# Patient Record
Sex: MALE | Race: WHITE | NOT HISPANIC OR LATINO | Employment: FULL TIME | URBAN - NONMETROPOLITAN AREA
[De-identification: names, ages, dates, MRNs, and addresses within clinical notes are randomized per-mention and may not be internally consistent; named-entity substitution may affect disease eponyms.]

---

## 2019-01-17 ENCOUNTER — TRANSCRIBE ORDERS (OUTPATIENT)
Dept: LAB | Facility: HOSPITAL | Age: 54
End: 2019-01-17

## 2019-01-17 ENCOUNTER — HOSPITAL ENCOUNTER (OUTPATIENT)
Dept: ULTRASOUND IMAGING | Facility: HOSPITAL | Age: 54
Discharge: HOME | End: 2019-01-17
Attending: INTERNAL MEDICINE | Admitting: INTERNAL MEDICINE

## 2019-01-17 DIAGNOSIS — R79.89 ABNORMAL LIVER FUNCTION TEST: ICD-10-CM

## 2019-01-17 DIAGNOSIS — R79.89 ABNORMAL LIVER FUNCTION TEST: Primary | ICD-10-CM

## 2019-01-17 PROCEDURE — 76705 ECHO EXAM OF ABDOMEN: CPT

## 2019-02-22 ENCOUNTER — APPOINTMENT (OUTPATIENT)
Dept: PREADMISSION TESTING | Facility: HOSPITAL | Age: 54
End: 2019-02-22

## 2019-02-25 ENCOUNTER — APPOINTMENT (OUTPATIENT)
Dept: PREADMISSION TESTING | Facility: HOSPITAL | Age: 54
End: 2019-02-25

## 2019-02-25 VITALS
SYSTOLIC BLOOD PRESSURE: 156 MMHG | HEART RATE: 88 BPM | OXYGEN SATURATION: 96 % | DIASTOLIC BLOOD PRESSURE: 89 MMHG | WEIGHT: 240.74 LBS | BODY MASS INDEX: 35.66 KG/M2 | HEIGHT: 69 IN

## 2019-02-25 LAB
ALBUMIN SERPL-MCNC: 4.9 G/DL (ref 3.5–5)
ALBUMIN/GLOB SERPL: 1.6 G/DL (ref 1.1–2.5)
ALP SERPL-CCNC: 63 U/L (ref 24–120)
ALT SERPL W P-5'-P-CCNC: 99 U/L (ref 0–54)
ANION GAP SERPL CALCULATED.3IONS-SCNC: 7 MMOL/L (ref 4–13)
AST SERPL-CCNC: 70 U/L (ref 7–45)
BASOPHILS # BLD AUTO: 0.03 10*3/MM3 (ref 0–0.2)
BASOPHILS NFR BLD AUTO: 0.5 % (ref 0–2)
BILIRUB SERPL-MCNC: 0.7 MG/DL (ref 0.1–1)
BUN BLD-MCNC: 19 MG/DL (ref 5–21)
BUN/CREAT SERPL: 16.5 (ref 7–25)
CALCIUM SPEC-SCNC: 10 MG/DL (ref 8.4–10.4)
CHLORIDE SERPL-SCNC: 104 MMOL/L (ref 98–110)
CO2 SERPL-SCNC: 28 MMOL/L (ref 24–31)
CREAT BLD-MCNC: 1.15 MG/DL (ref 0.5–1.4)
DEPRECATED RDW RBC AUTO: 40.6 FL (ref 40–54)
EOSINOPHIL # BLD AUTO: 0.03 10*3/MM3 (ref 0–0.7)
EOSINOPHIL NFR BLD AUTO: 0.5 % (ref 0–4)
ERYTHROCYTE [DISTWIDTH] IN BLOOD BY AUTOMATED COUNT: 12.4 % (ref 12–15)
GFR SERPL CREATININE-BSD FRML MDRD: 66 ML/MIN/1.73
GLOBULIN UR ELPH-MCNC: 3.1 GM/DL
GLUCOSE BLD-MCNC: 111 MG/DL (ref 70–100)
HCT VFR BLD AUTO: 44.3 % (ref 40–52)
HGB BLD-MCNC: 16.1 G/DL (ref 14–18)
IMM GRANULOCYTES # BLD AUTO: 0.03 10*3/MM3 (ref 0–0.05)
IMM GRANULOCYTES NFR BLD AUTO: 0.5 % (ref 0–5)
LYMPHOCYTES # BLD AUTO: 1.49 10*3/MM3 (ref 0.72–4.86)
LYMPHOCYTES NFR BLD AUTO: 27.2 % (ref 15–45)
MCH RBC QN AUTO: 33.1 PG (ref 28–32)
MCHC RBC AUTO-ENTMCNC: 36.3 G/DL (ref 33–36)
MCV RBC AUTO: 91 FL (ref 82–95)
MONOCYTES # BLD AUTO: 0.49 10*3/MM3 (ref 0.19–1.3)
MONOCYTES NFR BLD AUTO: 8.9 % (ref 4–12)
NEUTROPHILS # BLD AUTO: 3.41 10*3/MM3 (ref 1.87–8.4)
NEUTROPHILS NFR BLD AUTO: 62.4 % (ref 39–78)
NRBC BLD AUTO-RTO: 0 /100 WBC (ref 0–0)
PLATELET # BLD AUTO: 192 10*3/MM3 (ref 130–400)
PMV BLD AUTO: 9.4 FL (ref 6–12)
POTASSIUM BLD-SCNC: 4.4 MMOL/L (ref 3.5–5.3)
PROT SERPL-MCNC: 8 G/DL (ref 6.3–8.7)
RBC # BLD AUTO: 4.87 10*6/MM3 (ref 4.8–5.9)
SODIUM BLD-SCNC: 139 MMOL/L (ref 135–145)
WBC NRBC COR # BLD: 5.48 10*3/MM3 (ref 4.8–10.8)

## 2019-02-25 PROCEDURE — 36415 COLL VENOUS BLD VENIPUNCTURE: CPT

## 2019-02-25 PROCEDURE — 85025 COMPLETE CBC W/AUTO DIFF WBC: CPT | Performed by: SPECIALIST

## 2019-02-25 PROCEDURE — 80053 COMPREHEN METABOLIC PANEL: CPT | Performed by: SPECIALIST

## 2019-02-25 NOTE — DISCHARGE INSTRUCTIONS
DAY OF SURGERY INSTRUCTIONS        YOUR SURGEON: ***Damián    PROCEDURE: ***exam under anesthesia for hemorroids    DATE OF SURGERY: ***3/1/19    ARRIVAL TIME: AS DIRECTED BY OFFICE                    MANAGING PAIN AFTER SURGERY    We know you are probably wondering what your pain will be like after surgery.  Following surgery it is unrealistic to expect you will not have pain.   Pain is how our bodies let us know that something is wrong or cautions us to be careful.  That said, our goal is to make your pain tolerable.    Methods we may use to treat your pain include (oral or IV medications, PCAs, epidurals, nerve blocks, etc.)   While some procedures require IV pain medications for a short time after surgery, transitioning to pain medications by mouth allows for better management of pain.   Your nurse will encourage you to take oral pain medications whenever possible.  IV medications work almost immediately, but only last a short while.  Taking medications by mouth allows for a more constant level of medication in your blood stream for a longer period of time.      Once your pain is out of control it is harder to get back under control.  It is important you are aware when your next dose of pain medication is due.  If you are admitted, your nurse may write the time of your next dose on the white board in your room to help you remember.      We are interested in your pain and encourage you to inform us about aggravating factors during your visit.   Many times a simple repositioning every few hours can make a big difference.    If your physician says it is okay, do not let your pain prevent you from getting out of bed. Be sure to call your nurse for assistance prior to getting up so you do not fall.      Before surgery, please decide your tolerable pain goal.  These faces help describe the pain ratings we use on a 0-10 scale.   Be prepared to tell us your goal and whether or not you take pain or anxiety medications  at home.            BEFORE YOU COME TO THE HOSPITAL  (Pre-op instructions)  • Do not eat, drink, smoke or chew gum after midnight the night before surgery.  This also includes no mints.  • Morning of surgery take only the medicines you have been instructed with a sip of water unless otherwise instructed  by your physician.  • Do not shave, wear makeup or dark nail polish.  • Remove all jewelry including rings.  • Leave anything you consider valuable at home.  • Leave your suitcase in the car until after your surgery.  • Bring the following with you if applicable:  o Picture ID and insurance, Medicare or Medicaid cards  o Co-pay/deductible required by insurance (cash, check, credit card)  o Copy of advance directive, living will or power-of- documents if not brought to PAT  o CPAP or BIPAP mask and tubing  o Relaxation aids (MP3 player, book, magazine)  • On the day of surgery check in at registration located at the main entrance of the hospital.       Outpatient Surgery Guidelines, Adult  Outpatient procedures are those for which the person having the procedure is allowed to go home the same day as the procedure. Various procedures are done on an outpatient basis. You should follow some general guidelines if you will be having an outpatient procedure.  LET YOUR HEALTH CARE PROVIDER KNOW ABOUT:  · Any allergies you have.  · All medicines you are taking, including vitamins, herbs, eye drops, creams, and over-the-counter medicines.  · Previous problems you or members of your family have had with the use of anesthetics.  · Any blood disorders you have.  · Previous surgeries you have had.  · Medical conditions you have.  RISKS AND COMPLICATIONS  Your health care provider will discuss possible risks and complications with you before surgery. Common risks and complications include:    · Problems due to the use of anesthetics.  · Blood loss and replacement (does not apply to minor surgical procedures).  · Temporary  increase in pain due to surgery.  · Uncorrected pain or problems that the surgery was meant to correct.  · Infection.  · New damage.  BEFORE THE PROCEDURE  · Ask your health care provider about changing or stopping your regular medicines. You may need to stop taking certain medicines in the days or weeks before the procedure.  · Stop smoking at least 2 weeks before surgery. This lowers your risk for complications during and after surgery. Ask your health care provider for help with this if needed.  · Eat your usual meals and a light supper the day before surgery. Continue fluid intake. Do not drink alcohol.  · Do not eat or drink after midnight the night before your surgery.   · Arrange for someone to take you home and to stay with you for 24 hours after the procedure. Medicine given for your procedure may affect your ability to drive or to care for yourself.  · Call your health care provider's office if you develop an illness or problem that may prevent you from safely having your procedure.  AFTER THE PROCEDURE  After surgery, you will be taken to a recovery area, where your progress will be monitored. If there are no complications, you will be allowed to go home when you are awake, stable, and taking fluids well. You may have numbness around the surgical site. Healing will take some time. You will have tenderness at the surgical site and may have some swelling and bruising. You may also have some nausea.  HOME CARE INSTRUCTIONS  · Do not drive for 24 hours, or as directed by your health care provider. Do not drive while taking prescription pain medicines.  · Do not drink alcohol for 24 hours.  · Do not make important decisions or sign legal documents for 24 hours.  · You may resume a normal diet and activities as directed.  · Do not lift anything heavier than 10 pounds (4.5 kg) or play contact sports until your health care provider says it is okay.  · Change your bandages (dressings) as directed.  · Only take  over-the-counter or prescription medicines as directed by your health care provider.  · Follow up with your health care provider as directed.  SEEK MEDICAL CARE IF:  · You have increased bleeding (more than a small spot) from the surgical site.  · You have redness, swelling, or increasing pain in the wound.  · You see pus coming from the wound.  · You have a fever.  · You notice a bad smell coming from the wound or dressing.  · You feel lightheaded or faint.  · You develop a rash.  · You have trouble breathing.  · You develop allergies.  MAKE SURE YOU:  · Understand these instructions.  · Will watch your condition.  · Will get help right away if you are not doing well or get worse.     This information is not intended to replace advice given to you by your health care provider. Make sure you discuss any questions you have with your health care provider.     Document Released: 09/12/2002 Document Revised: 05/03/2016 Document Reviewed: 05/22/2014  [employer] Interactive Patient Education ©2016 [employer]       Fall Prevention in Hospitals, Adult  As a hospital patient, your condition and the treatments you receive can increase your risk for falls. Some additional risk factors for falls in a hospital include:  · Being in an unfamiliar environment.  · Being on bed rest.  · Your surgery.  · Taking certain medicines.  · Your tubing requirements, such as intravenous (IV) therapy or catheters.  It is important that you learn how to decrease fall risks while at the hospital. Below are important tips that can help prevent falls.  SAFETY TIPS FOR PREVENTING FALLS  Talk about your risk of falling.  · Ask your health care provider why you are at risk for falling. Is it your medicine, illness, tubing placement, or something else?  · Make a plan with your health care provider to keep you safe from falls.  · Ask your health care provider or pharmacist about side effects of your medicines. Some medicines can make you dizzy or  affect your coordination.  Ask for help.  · Ask for help before getting out of bed. You may need to press your call button.  · Ask for assistance in getting safely to the toilet.  · Ask for a walker or cane to be put at your bedside. Ask that most of the side rails on your bed be placed up before your health care provider leaves the room.  · Ask family or friends to sit with you.  · Ask for things that are out of your reach, such as your glasses, hearing aids, telephone, bedside table, or call button.  Follow these tips to avoid falling:  · Stay lying or seated, rather than standing, while waiting for help.  · Wear rubber-soled slippers or shoes whenever you walk in the hospital.  · Avoid quick, sudden movements.  ¨ Change positions slowly.  ¨ Sit on the side of your bed before standing.  ¨ Stand up slowly and wait before you start to walk.  · Let your health care provider know if there is a spill on the floor.  · Pay careful attention to the medical equipment, electrical cords, and tubes around you.  · When you need help, use your call button by your bed or in the bathroom. Wait for one of your health care providers to help you.  · If you feel dizzy or unsure of your footing, return to bed and wait for assistance.  · Avoid being distracted by the TV, telephone, or another person in your room.  · Do not lean or support yourself on rolling objects, such as IV poles or bedside tables.     This information is not intended to replace advice given to you by your health care provider. Make sure you discuss any questions you have with your health care provider.     Document Released: 12/15/2001 Document Revised: 01/08/2016 Document Reviewed: 08/25/2013  [employer] Interactive Patient Education ©2016 [employer]       Surgical Site Infections FAQs  What is a Surgical Site Infection (SSI)?  A surgical site infection is an infection that occurs after surgery in the part of the body where the surgery took place. Most patients  who have surgery do not develop an infection. However, infections develop in about 1 to 3 out of every 100 patients who have surgery.  Some of the common symptoms of a surgical site infection are:  · Redness and pain around the area where you had surgery  · Drainage of cloudy fluid from your surgical wound  · Fever  Can SSIs be treated?  Yes. Most surgical site infections can be treated with antibiotics. The antibiotic given to you depends on the bacteria (germs) causing the infection. Sometimes patients with SSIs also need another surgery to treat the infection.  What are some of the things that hospitals are doing to prevent SSIs?  To prevent SSIs, doctors, nurses, and other healthcare providers:  · Clean their hands and arms up to their elbows with an antiseptic agent just before the surgery.  · Clean their hands with soap and water or an alcohol-based hand rub before and after caring for each patient.  · May remove some of your hair immediately before your surgery using electric clippers if the hair is in the same area where the procedure will occur. They should not shave you with a razor.  · Wear special hair covers, masks, gowns, and gloves during surgery to keep the surgery area clean.  · Give you antibiotics before your surgery starts. In most cases, you should get antibiotics within 60 minutes before the surgery starts and the antibiotics should be stopped within 24 hours after surgery.  · Clean the skin at the site of your surgery with a special soap that kills germs.  What can I do to help prevent SSIs?  Before your surgery:  · Tell your doctor about other medical problems you may have. Health problems such as allergies, diabetes, and obesity could affect your surgery and your treatment.  · Quit smoking. Patients who smoke get more infections. Talk to your doctor about how you can quit before your surgery.  · Do not shave near where you will have surgery. Shaving with a razor can irritate your skin and  make it easier to develop an infection.  At the time of your surgery:  · Speak up if someone tries to shave you with a razor before surgery. Ask why you need to be shaved and talk with your surgeon if you have any concerns.  · Ask if you will get antibiotics before surgery.  After your surgery:  · Make sure that your healthcare providers clean their hands before examining you, either with soap and water or an alcohol-based hand rub.  · If you do not see your providers clean their hands, please ask them to do so.  · Family and friends who visit you should not touch the surgical wound or dressings.  · Family and friends should clean their hands with soap and water or an alcohol-based hand rub before and after visiting you. If you do not see them clean their hands, ask them to clean their hands.  What do I need to do when I go home from the hospital?  · Before you go home, your doctor or nurse should explain everything you need to know about taking care of your wound. Make sure you understand how to care for your wound before you leave the hospital.  · Always clean your hands before and after caring for your wound.  · Before you go home, make sure you know who to contact if you have questions or problems after you get home.  · If you have any symptoms of an infection, such as redness and pain at the surgery site, drainage, or fever, call your doctor immediately.  If you have additional questions, please ask your doctor or nurse.  Developed and co-sponsored by The Society for Healthcare Epidemiology of [place] (SHEA); Infectious Diseases Society of [place] (IDSA); American Hospital Association; Association for Professionals in Infection Control and Epidemiology (APIC); Centers for Disease Control and Prevention (CDC); and The Joint Commission.     This information is not intended to replace advice given to you by your health care provider. Make sure you discuss any questions you have with your health care  provider.     Document Released: 12/23/2014 Document Revised: 01/08/2016 Document Reviewed: 03/02/2016  [employer] Interactive Patient Education ©2016 [employer]     PATIENT/FAMILY/RESPONSIBLE PARTY VERBALIZES UNDERSTANDING OF ABOVE EDUCATION.  COPY OF PAIN SCALE GIVEN AND REVIEWED WITH VERBALIZED UNDERSTANDING.

## 2019-03-01 ENCOUNTER — ANESTHESIA (OUTPATIENT)
Dept: PERIOP | Facility: HOSPITAL | Age: 54
End: 2019-03-01

## 2019-03-01 ENCOUNTER — ANESTHESIA EVENT (OUTPATIENT)
Dept: PERIOP | Facility: HOSPITAL | Age: 54
End: 2019-03-01

## 2019-03-01 ENCOUNTER — HOSPITAL ENCOUNTER (OUTPATIENT)
Facility: HOSPITAL | Age: 54
Setting detail: HOSPITAL OUTPATIENT SURGERY
Discharge: HOME | End: 2019-03-01
Attending: SPECIALIST | Admitting: SPECIALIST

## 2019-03-01 VITALS
SYSTOLIC BLOOD PRESSURE: 131 MMHG | RESPIRATION RATE: 16 BRPM | TEMPERATURE: 97.4 F | OXYGEN SATURATION: 94 % | DIASTOLIC BLOOD PRESSURE: 76 MMHG | HEART RATE: 68 BPM

## 2019-03-01 DIAGNOSIS — K64.9 HEMORRHOID: ICD-10-CM

## 2019-03-01 PROCEDURE — 25010000002 PROPOFOL 10 MG/ML EMULSION: Performed by: NURSE ANESTHETIST, CERTIFIED REGISTERED

## 2019-03-01 PROCEDURE — 25010000002 FENTANYL CITRATE (PF) 250 MCG/5ML SOLUTION: Performed by: NURSE ANESTHETIST, CERTIFIED REGISTERED

## 2019-03-01 PROCEDURE — 88304 TISSUE EXAM BY PATHOLOGIST: CPT | Performed by: SPECIALIST

## 2019-03-01 PROCEDURE — 25010000002 SUCCINYLCHOLINE PER 20 MG: Performed by: NURSE ANESTHETIST, CERTIFIED REGISTERED

## 2019-03-01 PROCEDURE — 25010000002 MIDAZOLAM PER 1 MG: Performed by: ANESTHESIOLOGY

## 2019-03-01 PROCEDURE — 25010000002 ONDANSETRON PER 1 MG: Performed by: NURSE ANESTHETIST, CERTIFIED REGISTERED

## 2019-03-01 PROCEDURE — 25010000002 DEXAMETHASONE PER 1 MG: Performed by: NURSE ANESTHETIST, CERTIFIED REGISTERED

## 2019-03-01 DEVICE — KT HEMOST ABS SURGIFOAM PWDR 1GRAM: Type: IMPLANTABLE DEVICE | Status: FUNCTIONAL

## 2019-03-01 RX ORDER — ROCURONIUM BROMIDE 10 MG/ML
INJECTION, SOLUTION INTRAVENOUS AS NEEDED
Status: DISCONTINUED | OUTPATIENT
Start: 2019-03-01 | End: 2019-03-01 | Stop reason: SURG

## 2019-03-01 RX ORDER — BACITRACIN ZINC 500 [USP'U]/G
OINTMENT TOPICAL AS NEEDED
Status: DISCONTINUED | OUTPATIENT
Start: 2019-03-01 | End: 2019-03-01 | Stop reason: HOSPADM

## 2019-03-01 RX ORDER — MIDAZOLAM HYDROCHLORIDE 1 MG/ML
2 INJECTION INTRAMUSCULAR; INTRAVENOUS
Status: DISCONTINUED | OUTPATIENT
Start: 2019-03-01 | End: 2019-03-01 | Stop reason: HOSPADM

## 2019-03-01 RX ORDER — NALOXONE HCL 0.4 MG/ML
0.4 VIAL (ML) INJECTION AS NEEDED
Status: DISCONTINUED | OUTPATIENT
Start: 2019-03-01 | End: 2019-03-01 | Stop reason: HOSPADM

## 2019-03-01 RX ORDER — LABETALOL HYDROCHLORIDE 5 MG/ML
5 INJECTION, SOLUTION INTRAVENOUS
Status: DISCONTINUED | OUTPATIENT
Start: 2019-03-01 | End: 2019-03-01 | Stop reason: HOSPADM

## 2019-03-01 RX ORDER — OXYCODONE AND ACETAMINOPHEN 7.5; 325 MG/1; MG/1
2 TABLET ORAL EVERY 4 HOURS PRN
Status: DISCONTINUED | OUTPATIENT
Start: 2019-03-01 | End: 2019-03-01 | Stop reason: HOSPADM

## 2019-03-01 RX ORDER — OXYCODONE HYDROCHLORIDE AND ACETAMINOPHEN 5; 325 MG/1; MG/1
1-2 TABLET ORAL EVERY 4 HOURS PRN
Qty: 40 TABLET | Refills: 0 | Status: SHIPPED | OUTPATIENT
Start: 2019-03-01 | End: 2020-01-23

## 2019-03-01 RX ORDER — SODIUM CHLORIDE, SODIUM LACTATE, POTASSIUM CHLORIDE, CALCIUM CHLORIDE 600; 310; 30; 20 MG/100ML; MG/100ML; MG/100ML; MG/100ML
9 INJECTION, SOLUTION INTRAVENOUS CONTINUOUS
Status: DISCONTINUED | OUTPATIENT
Start: 2019-03-01 | End: 2019-03-01 | Stop reason: HOSPADM

## 2019-03-01 RX ORDER — IPRATROPIUM BROMIDE AND ALBUTEROL SULFATE 2.5; .5 MG/3ML; MG/3ML
3 SOLUTION RESPIRATORY (INHALATION) ONCE AS NEEDED
Status: DISCONTINUED | OUTPATIENT
Start: 2019-03-01 | End: 2019-03-01 | Stop reason: HOSPADM

## 2019-03-01 RX ORDER — IBUPROFEN 600 MG/1
600 TABLET ORAL ONCE AS NEEDED
Status: DISCONTINUED | OUTPATIENT
Start: 2019-03-01 | End: 2019-03-01 | Stop reason: HOSPADM

## 2019-03-01 RX ORDER — MAGNESIUM HYDROXIDE 1200 MG/15ML
LIQUID ORAL AS NEEDED
Status: DISCONTINUED | OUTPATIENT
Start: 2019-03-01 | End: 2019-03-01 | Stop reason: HOSPADM

## 2019-03-01 RX ORDER — KETOROLAC TROMETHAMINE 10 MG/1
10 TABLET, FILM COATED ORAL EVERY 6 HOURS PRN
Status: DISCONTINUED | OUTPATIENT
Start: 2019-03-01 | End: 2019-03-01 | Stop reason: HOSPADM

## 2019-03-01 RX ORDER — LIDOCAINE HYDROCHLORIDE 20 MG/ML
INJECTION, SOLUTION INFILTRATION; PERINEURAL AS NEEDED
Status: DISCONTINUED | OUTPATIENT
Start: 2019-03-01 | End: 2019-03-01 | Stop reason: SURG

## 2019-03-01 RX ORDER — SODIUM CHLORIDE 0.9 % (FLUSH) 0.9 %
3 SYRINGE (ML) INJECTION AS NEEDED
Status: DISCONTINUED | OUTPATIENT
Start: 2019-03-01 | End: 2019-03-01 | Stop reason: HOSPADM

## 2019-03-01 RX ORDER — BUPIVACAINE HYDROCHLORIDE AND EPINEPHRINE 2.5; 5 MG/ML; UG/ML
INJECTION, SOLUTION INFILTRATION; PERINEURAL AS NEEDED
Status: DISCONTINUED | OUTPATIENT
Start: 2019-03-01 | End: 2019-03-01 | Stop reason: HOSPADM

## 2019-03-01 RX ORDER — METOCLOPRAMIDE HYDROCHLORIDE 5 MG/ML
5 INJECTION INTRAMUSCULAR; INTRAVENOUS
Status: DISCONTINUED | OUTPATIENT
Start: 2019-03-01 | End: 2019-03-01 | Stop reason: HOSPADM

## 2019-03-01 RX ORDER — OXYCODONE AND ACETAMINOPHEN 10; 325 MG/1; MG/1
1 TABLET ORAL ONCE AS NEEDED
Status: DISCONTINUED | OUTPATIENT
Start: 2019-03-01 | End: 2019-03-01 | Stop reason: HOSPADM

## 2019-03-01 RX ORDER — DEXTROSE MONOHYDRATE 25 G/50ML
12.5 INJECTION, SOLUTION INTRAVENOUS AS NEEDED
Status: DISCONTINUED | OUTPATIENT
Start: 2019-03-01 | End: 2019-03-01 | Stop reason: HOSPADM

## 2019-03-01 RX ORDER — DEXAMETHASONE SODIUM PHOSPHATE 4 MG/ML
INJECTION, SOLUTION INTRA-ARTICULAR; INTRALESIONAL; INTRAMUSCULAR; INTRAVENOUS; SOFT TISSUE AS NEEDED
Status: DISCONTINUED | OUTPATIENT
Start: 2019-03-01 | End: 2019-03-01 | Stop reason: SURG

## 2019-03-01 RX ORDER — MEPERIDINE HYDROCHLORIDE 25 MG/ML
12.5 INJECTION INTRAMUSCULAR; INTRAVENOUS; SUBCUTANEOUS
Status: DISCONTINUED | OUTPATIENT
Start: 2019-03-01 | End: 2019-03-01 | Stop reason: HOSPADM

## 2019-03-01 RX ORDER — MIDAZOLAM HYDROCHLORIDE 1 MG/ML
1 INJECTION INTRAMUSCULAR; INTRAVENOUS
Status: DISCONTINUED | OUTPATIENT
Start: 2019-03-01 | End: 2019-03-01 | Stop reason: HOSPADM

## 2019-03-01 RX ORDER — ONDANSETRON 2 MG/ML
4 INJECTION INTRAMUSCULAR; INTRAVENOUS ONCE AS NEEDED
Status: DISCONTINUED | OUTPATIENT
Start: 2019-03-01 | End: 2019-03-01 | Stop reason: HOSPADM

## 2019-03-01 RX ORDER — PROPOFOL 10 MG/ML
VIAL (ML) INTRAVENOUS AS NEEDED
Status: DISCONTINUED | OUTPATIENT
Start: 2019-03-01 | End: 2019-03-01 | Stop reason: SURG

## 2019-03-01 RX ORDER — FENTANYL CITRATE 50 UG/ML
INJECTION, SOLUTION INTRAMUSCULAR; INTRAVENOUS AS NEEDED
Status: DISCONTINUED | OUTPATIENT
Start: 2019-03-01 | End: 2019-03-01 | Stop reason: SURG

## 2019-03-01 RX ORDER — SUCCINYLCHOLINE CHLORIDE 20 MG/ML
INJECTION INTRAMUSCULAR; INTRAVENOUS AS NEEDED
Status: DISCONTINUED | OUTPATIENT
Start: 2019-03-01 | End: 2019-03-01 | Stop reason: SURG

## 2019-03-01 RX ORDER — KETOROLAC TROMETHAMINE 10 MG/1
10 TABLET, FILM COATED ORAL EVERY 6 HOURS PRN
Qty: 20 TABLET | Refills: 0 | Status: SHIPPED | OUTPATIENT
Start: 2019-03-01 | End: 2019-03-06

## 2019-03-01 RX ORDER — SODIUM CHLORIDE 0.9 % (FLUSH) 0.9 %
1-10 SYRINGE (ML) INJECTION AS NEEDED
Status: DISCONTINUED | OUTPATIENT
Start: 2019-03-01 | End: 2019-03-01 | Stop reason: HOSPADM

## 2019-03-01 RX ORDER — SODIUM CHLORIDE, SODIUM LACTATE, POTASSIUM CHLORIDE, CALCIUM CHLORIDE 600; 310; 30; 20 MG/100ML; MG/100ML; MG/100ML; MG/100ML
1000 INJECTION, SOLUTION INTRAVENOUS CONTINUOUS
Status: DISCONTINUED | OUTPATIENT
Start: 2019-03-01 | End: 2019-03-01 | Stop reason: HOSPADM

## 2019-03-01 RX ORDER — FENTANYL CITRATE 50 UG/ML
25 INJECTION, SOLUTION INTRAMUSCULAR; INTRAVENOUS
Status: DISCONTINUED | OUTPATIENT
Start: 2019-03-01 | End: 2019-03-01 | Stop reason: HOSPADM

## 2019-03-01 RX ORDER — OXYCODONE HYDROCHLORIDE AND ACETAMINOPHEN 5; 325 MG/1; MG/1
1 TABLET ORAL ONCE AS NEEDED
Status: DISCONTINUED | OUTPATIENT
Start: 2019-03-01 | End: 2019-03-01 | Stop reason: HOSPADM

## 2019-03-01 RX ORDER — FENTANYL CITRATE 50 UG/ML
25 INJECTION, SOLUTION INTRAMUSCULAR; INTRAVENOUS AS NEEDED
Status: DISCONTINUED | OUTPATIENT
Start: 2019-03-01 | End: 2019-03-01 | Stop reason: HOSPADM

## 2019-03-01 RX ORDER — ULTRASOUND COUPLING MEDIUM
GEL (GRAM) TOPICAL AS NEEDED
Status: DISCONTINUED | OUTPATIENT
Start: 2019-03-01 | End: 2019-03-01 | Stop reason: HOSPADM

## 2019-03-01 RX ORDER — ONDANSETRON 2 MG/ML
INJECTION INTRAMUSCULAR; INTRAVENOUS AS NEEDED
Status: DISCONTINUED | OUTPATIENT
Start: 2019-03-01 | End: 2019-03-01 | Stop reason: SURG

## 2019-03-01 RX ADMIN — FENTANYL CITRATE 200 MCG: 50 INJECTION INTRAMUSCULAR; INTRAVENOUS at 07:42

## 2019-03-01 RX ADMIN — PROPOFOL 200 MG: 10 INJECTION, EMULSION INTRAVENOUS at 07:42

## 2019-03-01 RX ADMIN — DEXAMETHASONE SODIUM PHOSPHATE 4 MG: 4 INJECTION, SOLUTION INTRAMUSCULAR; INTRAVENOUS at 08:11

## 2019-03-01 RX ADMIN — SUCCINYLCHOLINE CHLORIDE 120 MG: 20 INJECTION, SOLUTION INTRAMUSCULAR; INTRAVENOUS at 07:42

## 2019-03-01 RX ADMIN — SODIUM CHLORIDE, POTASSIUM CHLORIDE, SODIUM LACTATE AND CALCIUM CHLORIDE: 600; 310; 30; 20 INJECTION, SOLUTION INTRAVENOUS at 08:05

## 2019-03-01 RX ADMIN — MIDAZOLAM 2 MG: 1 INJECTION INTRAMUSCULAR; INTRAVENOUS at 06:39

## 2019-03-01 RX ADMIN — ONDANSETRON HYDROCHLORIDE 4 MG: 2 SOLUTION INTRAMUSCULAR; INTRAVENOUS at 08:11

## 2019-03-01 RX ADMIN — LIDOCAINE HYDROCHLORIDE 100 MG: 20 INJECTION, SOLUTION INFILTRATION; PERINEURAL at 07:42

## 2019-03-01 RX ADMIN — SODIUM CHLORIDE, POTASSIUM CHLORIDE, SODIUM LACTATE AND CALCIUM CHLORIDE 1000 ML: 600; 310; 30; 20 INJECTION, SOLUTION INTRAVENOUS at 06:07

## 2019-03-01 RX ADMIN — FENTANYL CITRATE 50 MCG: 50 INJECTION INTRAMUSCULAR; INTRAVENOUS at 07:55

## 2019-03-01 RX ADMIN — CEFAZOLIN 2 G: 1 INJECTION, POWDER, FOR SOLUTION INTRAMUSCULAR; INTRAVENOUS; PARENTERAL at 07:55

## 2019-03-01 RX ADMIN — ROCURONIUM BROMIDE 10 MG: 10 INJECTION INTRAVENOUS at 07:42

## 2019-03-01 NOTE — OP NOTE
Preoperative diagnosis:  Symptomatic hemorrhoids  Postoperative diagnosis:  Same  Procedure:  Exam under anesthesia, two column internal and external closed excisional hemorrhoidectomy  Surgeon:  Tori Steel MD  Anesthesia:  Get loc  Ebl:  Minimal  Ivf:  See anesthesia  Indications:  The patient is a 53-year-old male who presents for hemorrhoidectomy. The risks, benefits, complications, and possible alternatives were discussed with patient who agreed to proceed  Description of procedure:  The patient was laid prone.  The perianal region was prepped and draped.  No mucosal lesions were identified in the anus or visualized or palpated rectum.  There were noninflamed skin tags and mildly engogred internal hemorrhoids.  The right posterior column was suture ligated at the apex of the internal hemorrhoid with 2-0 chromic.  Another 2-0 chromic was placed.  15 blade scalpel was used to excise the skin and underlying hemorrhoid.  The skin edges were cauterized.  The mucosal edges were reapproximated with the 2-0 chromic in a running locking fashion.  At the anterior midline there was another noninflamed skin tag with minimally engorged internal hemorrhoids.  This area was resected in the same fashion.  Bacitracin was placed in the anal canal.  Gelfoam soaked in marcaine was placed in the canal.  Dry dressings were applied.  The patient was laid supine.  The sponge, needle, and instrument counts were correct.  Complications:  None  Disposition: good to pacu  Findings:   noninflamed skin tags with mildly engorged internal hemorrhoids

## 2019-03-01 NOTE — ANESTHESIA PROCEDURE NOTES
Airway  Airway not difficult    General Information and Staff    Patient location during procedure: OR  CRNA: Seamus Garcia CRNA    Indications and Patient Condition  Indications for airway management: airway protection    Preoxygenated: yes  Mask difficulty assessment: 1 - vent by mask    Final Airway Details  Final airway type: endotracheal airway      Successful airway: ETT  Cuffed: yes   Successful intubation technique: direct laryngoscopy  Facilitating devices/methods: intubating stylet  Blade: Prieto  Blade size: 2  ETT size (mm): 7.5  Cormack-Lehane Classification: grade IIa - partial view of glottis  Placement verified by: chest auscultation and capnometry   Cuff volume (mL): 8  Measured from: teeth  ETT to teeth (cm): 23  Number of attempts at approach: 1

## 2019-03-01 NOTE — DISCHARGE INSTRUCTIONS

## 2019-03-01 NOTE — H&P
Tori Steel MD [facility] History and Physical     Referring Provider: Tori Steel MD    Patient Care Team:  Erik Salas MD as PCP - General (Internal Medicine)    Chief complaint hemorrhoids    Subjective .     History of present illness:  The patient is a 53 y.o. male who presents for exam under anesthesia and hemorrhoidectomy.    Review of Systems    Review of Systems - General ROS: negative  ENT ROS: negative  Respiratory ROS: no cough, shortness of breath, or wheezing  Cardiovascular ROS: no chest pain or dyspnea on exertion  Gastrointestinal ROS: no abdominal pain, change in bowel habits, or black or bloody stools  Genito-Urinary ROS: no dysuria, trouble voiding, or hematuria  Dermatological ROS: negative   Breast ROS: negative for breast lumps  Hematological and Lymphatic ROS: negative  Musculoskeletal ROS: negative   Neurological ROS: no TIA or stroke symptoms    Psychological ROS: negative  Endocrine ROS: negative    History  History reviewed. No pertinent past medical history.,   Past Surgical History:   Procedure Laterality Date   • COLONOSCOPY     , History reviewed. No pertinent family history.,   Social History     Tobacco Use   • Smoking status: Never Smoker   • Smokeless tobacco: Never Used   Substance Use Topics   • Alcohol use: No     Frequency: Never   • Drug use: No   ,   No medications prior to admission.    and Allergies:  Patient has no known allergies.    Current Facility-Administered Medications:   •  buffered lidocaine syringe 0.5 mL, 0.5 mL, Intradermal, Once PRN, Tori Steel MD  •  buffered lidocaine syringe 0.5 mL, 0.5 mL, Injection, Once PRN, Sigifredo Borja MD  •  bupivacaine-EPINEPHrine (MARCAINE w/EPI) injection, , , PRN, Tori Steel MD, 30 mL at 03/01/19 0715  •  ceFAZolin (ANCEF) 1 g/100 mL 0.9% NS IVPB (mbp), 1 g, Intravenous, Once, Tori Steel MD  •  dextrose (D50W) 25 g/ 50mL Intravenous Solution 12.5 g, 12.5 g, Intravenous, PRN,  Sigifredo Borja MD  •  fentaNYL citrate (PF) (SUBLIMAZE) injection 25 mcg, 25 mcg, Intravenous, Q5 Min PRN, Sigifredo Borja MD  •  lactated ringers infusion 1,000 mL, 1,000 mL, Intravenous, Continuous, Tori Steel MD, Last Rate: 25 mL/hr at 03/01/19 0607, 1,000 mL at 03/01/19 0607  •  lactated ringers infusion, 9 mL/hr, Intravenous, Continuous, Sigifredo Borja MD  •  midazolam (VERSED) injection 1 mg, 1 mg, Intravenous, Q5 Min PRN **OR** midazolam (VERSED) injection 2 mg, 2 mg, Intravenous, Q5 Min PRN, Sigifredo Borja MD, 2 mg at 03/01/19 0639  •  sodium chloride 0.9 % flush 1-10 mL, 1-10 mL, Intravenous, PRN, Sigifredo Borja MD  •  sodium chloride 0.9 % flush 3 mL, 3 mL, Intravenous, PRN, Tori Steel MD    Objective     Vital Signs   Temp:  [98.2 °F (36.8 °C)] 98.2 °F (36.8 °C)  Heart Rate:  [67-78] 67  Resp:  [16] 16  BP: (144)/(94) 144/94    Physical Exam:  General appearance - alert, well appearing, and in no distress  Mental status - alert, oriented to person, place, and time  Neck - supple, no significant adenopathy  Chest - clear to auscultation, no wheezes, rales or rhonchi, symmetric air entry  Heart - normal rate, regular rhythm, normal S1, S2, no murmurs, rubs, clicks or gallops  Abdomen - soft, nontender, nondistended, no masses or organomegaly  Rectal - normal rectal, no masses  Back exam - full range of motion, no tenderness, palpable spasm or pain on motion    Results Review:     Lab Results (last 24 hours)     ** No results found for the last 24 hours. **        Imaging Results (last 24 hours)     ** No results found for the last 24 hours. **            Assessment/Plan       Hemorrhoids. He will undergo exam under anesthesia and exicision of hemorrhiods.  The risks, benefits, complications, and possible alternatives were discussed with the patient who agreed to proceed.      Tori Steel MD  03/01/19  7:28 AM

## 2019-03-01 NOTE — ANESTHESIA POSTPROCEDURE EVALUATION
Patient: Jefry Kenney    Procedure Summary     Date:  03/01/19 Room / Location:   [facility] OR 09 /  [facility]    Anesthesia Start:  0738 Anesthesia Stop:  0835    Procedure:  EXAM UNDER ANESTHESIA, HEMORROIDECTOMY (N/A Rectum) Diagnosis:  (HEMORROID)    Surgeon:  Tori Steel MD Provider:  Seamus Garcia CRNA    Anesthesia Type:  general ASA Status:  2          Anesthesia Type: general  Last vitals  BP   130/75 (03/01/19 0930)   Temp   97.4 °F (36.3 °C) (03/01/19 0900)   Pulse   68 (03/01/19 0930)   Resp   16 (03/01/19 0930)     SpO2   92 % (03/01/19 0930)     Post Anesthesia Care and Evaluation    Patient location during evaluation: PACU  Patient participation: complete - patient participated  Level of consciousness: awake and alert  Pain management: adequate  Airway patency: patent  Anesthetic complications: No anesthetic complications    Cardiovascular status: acceptable  Respiratory status: acceptable  Hydration status: acceptable    Comments: Blood pressure 130/75, pulse 68, temperature 97.4 °F (36.3 °C), resp. rate 16, SpO2 92 %.    Pt discharged from PACU based on phoebe score >8

## 2019-03-01 NOTE — ANESTHESIA PREPROCEDURE EVALUATION
Anesthesia Evaluation     Patient summary reviewed   no history of anesthetic complications:  NPO Solid Status: > 8 hours             Airway   Mallampati: II  TM distance: >3 FB  Neck ROM: full  Dental      Pulmonary    (-) COPD, asthma, sleep apnea, not a smoker  Cardiovascular   Exercise tolerance: excellent (>7 METS)    (-) pacemaker, past MI, angina, cardiac stents      Neuro/Psych  (-) seizures, TIA, CVA  GI/Hepatic/Renal/Endo    (+) obesity,     (-) GERD, liver disease, no renal disease, diabetes    Musculoskeletal     Abdominal    Substance History      OB/GYN          Other                        Anesthesia Plan    ASA 2     general     intravenous induction   Anesthetic plan, all risks, benefits, and alternatives have been provided, discussed and informed consent has been obtained with: patient.

## 2019-03-04 LAB
CYTO UR: NORMAL
LAB AP CASE REPORT: NORMAL
PATH REPORT.FINAL DX SPEC: NORMAL
PATH REPORT.GROSS SPEC: NORMAL

## 2020-01-23 ENCOUNTER — OFFICE VISIT (OUTPATIENT)
Dept: GASTROENTEROLOGY | Facility: CLINIC | Age: 55
End: 2020-01-23

## 2020-01-23 VITALS
OXYGEN SATURATION: 98 % | SYSTOLIC BLOOD PRESSURE: 142 MMHG | DIASTOLIC BLOOD PRESSURE: 80 MMHG | HEART RATE: 82 BPM | WEIGHT: 239 LBS | BODY MASS INDEX: 35.4 KG/M2 | HEIGHT: 69 IN

## 2020-01-23 DIAGNOSIS — Z78.9 NONSMOKER: ICD-10-CM

## 2020-01-23 DIAGNOSIS — K63.5 HYPERPLASTIC COLONIC POLYP, UNSPECIFIED PART OF COLON: Primary | ICD-10-CM

## 2020-01-23 PROCEDURE — S0285 CNSLT BEFORE SCREEN COLONOSC: HCPCS | Performed by: NURSE PRACTITIONER

## 2020-01-23 RX ORDER — SODIUM, POTASSIUM,MAG SULFATES 17.5-3.13G
SOLUTION, RECONSTITUTED, ORAL ORAL
Qty: 2 BOTTLE | Refills: 0 | Status: SHIPPED | OUTPATIENT
Start: 2020-01-23 | End: 2020-03-10

## 2020-01-23 NOTE — PROGRESS NOTES
Jefry Kenney  1965 1/23/2020  Chief Complaint   Patient presents with   • Colonoscopy     Subjective   HPI  Jefry Kenney is a 54 y.o. male who presents as a referral for preventative maintenance. He has no complaints of nausea or vomiting. No change in bowels. No wt loss. No BRBPR. No melena. There is no family hx for colon cancer. No abdominal pain.  Past Medical History:   Diagnosis Date   • Hx of colonic polyp      Past Surgical History:   Procedure Laterality Date   • COLONOSCOPY  01/15/2015    Hyperplastic polyp at 45 cm repeat exam in 5 years   • HEMORRHOIDECTOMY     • RECTAL EXAMINATION UNDER ANESTHESIA N/A 3/1/2019    Procedure: EXAM UNDER ANESTHESIA, HEMORROIDECTOMY;  Surgeon: Tori Steel MD;  Location: [facility] OR;  Service: General     No outpatient medications have been marked as taking for the 1/23/20 encounter (Office Visit) with Darby Wilder APRN.     No Known Allergies  Social History     Socioeconomic History   • Marital status:      Spouse name: Not on file   • Number of children: Not on file   • Years of education: Not on file   • Highest education level: Not on file   Tobacco Use   • Smoking status: Never Smoker   • Smokeless tobacco: Never Used   Substance and Sexual Activity   • Alcohol use: No     Frequency: Never   • Drug use: No   • Sexual activity: Defer     Family History   Problem Relation Age of Onset   • Colon polyps Father    • Colon cancer Neg Hx      Health Maintenance   Topic Date Due   • ANNUAL PHYSICAL  04/02/1967   • TDAP/TD VACCINES (1 - Tdap) 04/02/1975   • ZOSTER VACCINE (1 of 2) 04/02/2014   • HEPATITIS C SCREENING  02/25/2019   • INFLUENZA VACCINE  08/01/2019   • COLONOSCOPY  01/15/2025       REVIEW OF SYSTEMS  General: well appearing, no fever chills or sweats, no unexplained wt loss  HEENT: no acute visual or hearing disturbances  Cardiovascular: No chest pain or palpitations  Pulmonary: No shortness of breath, coughing, wheezing or  "hemoptysis  : No burning, urgency, hematuria, or dysuria  Musculoskeletal: No joint pain or stiffness  Peripheral: no edema  Skin: No lesions or rashes  Neuro: No dizziness, headaches, stroke, syncope  Endocrine: No hot or cold intolerances  Hematological: No blood dyscrasias    Objective   Vitals:    01/23/20 1256   BP: 142/80   Pulse: 82   SpO2: 98%   Weight: 108 kg (239 lb)   Height: 175.3 cm (69\")     Body mass index is 35.29 kg/m².  Patient's Body mass index is 35.29 kg/m². BMI is above normal parameters. Recommendations include: nutrition counseling.      PHYSICAL EXAM  General: age appropriate well nourished well appearing, no acute distress  Head: normocephalic and atraumatic  Global assessment-supple  Neck-No JVD noted, no lymphadenopathy  Pulmonary-clear to auscultation bilaterally, normal respiratory effort  Cardiovascular-normal rate and rhythm, normal heart sounds, S1 and S2 noted  Abdomen-soft, non tender, non distended, normal bowel sounds all 4 quadrants, no hepatosplenomegaly noted  Extremities-No clubbing cyanosis or edema  Neuro-Non focal, converses appropriately, awake, alert, oriented    Assessment/Plan     Jefry was seen today for colonoscopy.    Diagnoses and all orders for this visit:    Hyperplastic colonic polyp, unspecified part of colon  Comments:  last colonoscopy 1/15/2015: hyperplastic polyp at 45cm  Orders:  -     Case Request; Standing  -     Follow Anesthesia Guidelines / Standing Orders; Future  -     Obtain Informed Consent; Future  -     Case Request  -     sodium-potassium-magnesium sulfates (SUPREP BOWEL PREP KIT) 17.5-3.13-1.6 GM/177ML solution oral solution; Take as directed per office    Nonsmoker        COLONOSCOPY WITH ANESTHESIA (N/A)  Body mass index is 35.29 kg/m².    Patient instructions on prep prior to procedure provided to the patient.    All risks, benefits, alternatives, and indications of colonoscopy procedure have been discussed with the patient. Risks to " include perforation of the colon requiring possible surgery or colostomy, risk of bleeding from biopsies or removal of colon tissue, possibility of missing a colon polyp or cancer, or adverse drug reaction.  Benefits to include the diagnosis and management of disease of the colon and rectum. Alternatives to include barium enema, radiographic evaluation, lab testing or no intervention. Pt verbalizes understanding and agrees.     Darby Wilder, APRN  2020  1:29 PM      IF YOU SMOKE OR USE TOBACCO PLEASE READ THE FOLLOWIN minutes reading provided    Why is smoking bad for me?  Smoking increases the risk of heart disease, lung disease, vascular disease, stroke, and cancer.     If you smoke, STOP!    If you would like more information on quitting smoking, please visit the [employer] website: [email or web address]   This link will provide additional resources including the QUIT line and the Beat the Pack support groups.     For more information:    Quit Now [place]  -QUIT-NOW  https://[place].[employer].org/en-US/    Obesity, Adult  Obesity is the condition of having too much total body fat. Being overweight or obese means that your weight is greater than what is considered healthy for your body size. Obesity is determined by a measurement called BMI. BMI is an estimate of body fat and is calculated from height and weight. For adults, a BMI of 30 or higher is considered obese.  Obesity can eventually lead to other health concerns and major illnesses, including:  · Stroke.  · Coronary artery disease (CAD).  · Type 2 diabetes.  · Some types of cancer, including cancers of the colon, breast, uterus, and gallbladder.  · Osteoarthritis.  · High blood pressure (hypertension).  · High cholesterol.  · Sleep apnea.  · Gallbladder stones.  · Infertility problems.  What are the causes?  The main cause of obesity is taking in (consuming) more calories than your body  uses for energy. Other factors that contribute to this condition may include:  · Being born with genes that make you more likely to become obese.  · Having a medical condition that causes obesity. These conditions include:  ¨ Hypothyroidism.  ¨ Polycystic ovarian syndrome (PCOS).  ¨ Binge-eating disorder.  ¨ Cushing syndrome.  · Taking certain medicines, such as steroids, antidepressants, and seizure medicines.  · Not being physically active (sedentary lifestyle).  · Living where there are limited places to exercise safely or buy healthy foods.  · Not getting enough sleep.  What increases the risk?  The following factors may increase your risk of this condition:  · Having a family history of obesity.  · Being a woman of -American descent.  · Being a man of  descent.  What are the signs or symptoms?  Having excessive body fat is the main symptom of this condition.  How is this diagnosed?  This condition may be diagnosed based on:  · Your symptoms.  · Your medical history.  · A physical exam. Your health care provider may measure:  ¨ Your BMI. If you are an adult with a BMI between 25 and less than 30, you are considered overweight. If you are an adult with a BMI of 30 or higher, you are considered obese.  ¨ The distances around your hips and your waist (circumferences). These may be compared to each other to help diagnose your condition.  ¨ Your skinfold thickness. Your health care provider may gently pinch a fold of your skin and measure it.  How is this treated?  Treatment for this condition often includes changing your lifestyle. Treatment may include some or all of the following:  · Dietary changes. Work with your health care provider and a dietitian to set a weight-loss goal that is healthy and reasonable for you. Dietary changes may include eating:  ¨ Smaller portions. A portion size is the amount of a particular food that is healthy for you to eat at one time. This varies from person to  person.  ¨ Low-calorie or low-fat options.  ¨ More whole grains, fruits, and vegetables.  · Regular physical activity. This may include aerobic activity (cardio) and strength training.  · Medicine to help you lose weight. Your health care provider may prescribe medicine if you are unable to lose 1 pound a week after 6 weeks of eating more healthily and doing more physical activity.  · Surgery. Surgical options may include gastric banding and gastric bypass. Surgery may be done if:  ¨ Other treatments have not helped to improve your condition.  ¨ You have a BMI of 40 or higher.  ¨ You have life-threatening health problems related to obesity.  Follow these instructions at home:     Eating and drinking     · Follow recommendations from your health care provider about what you eat and drink. Your health care provider may advise you to:  ¨ Limit fast foods, sweets, and processed snack foods.  ¨ Choose low-fat options, such as low-fat milk instead of whole milk.  ¨ Eat 5 or more servings of fruits or vegetables every day.  ¨ Eat at home more often. This gives you more control over what you eat.  ¨ Choose healthy foods when you eat out.  ¨ Learn what a healthy portion size is.  ¨ Keep low-fat snacks on hand.  ¨ Avoid sugary drinks, such as soda, fruit juice, iced tea sweetened with sugar, and flavored milk.  ¨ Eat a healthy breakfast.  · Drink enough water to keep your urine clear or pale yellow.  · Do not go without eating for long periods of time (do not fast) or follow a fad diet. Fasting and fad diets can be unhealthy and even dangerous.  Physical Activity   · Exercise regularly, as told by your health care provider. Ask your health care provider what types of exercise are safe for you and how often you should exercise.  · Warm up and stretch before being active.  · Cool down and stretch after being active.  · Rest between periods of activity.  Lifestyle   · Limit the time that you spend in front of your TV, computer,  or video game system.  · Find ways to reward yourself that do not involve food.  · Limit alcohol intake to no more than 1 drink a day for nonpregnant women and 2 drinks a day for men. One drink equals 12 oz of beer, 5 oz of wine, or 1½ oz of hard liquor.  General instructions   · Keep a weight loss journal to keep track of the food you eat and how much you exercise you get.  · Take over-the-counter and prescription medicines only as told by your health care provider.  · Take vitamins and supplements only as told by your health care provider.  · Consider joining a support group. Your health care provider may be able to recommend a support group.  · Keep all follow-up visits as told by your health care provider. This is important.  Contact a health care provider if:  · You are unable to meet your weight loss goal after 6 weeks of dietary and lifestyle changes.  This information is not intended to replace advice given to you by your health care provider. Make sure you discuss any questions you have with your health care provider.  Document Released: 01/25/2006 Document Revised: 05/22/2017 Document Reviewed: 10/05/2016  [employer] Interactive Patient Education © 2017 [employer]

## 2020-03-06 DIAGNOSIS — Z12.5 SCREENING PSA (PROSTATE SPECIFIC ANTIGEN): ICD-10-CM

## 2020-03-06 DIAGNOSIS — R73.01 IFG (IMPAIRED FASTING GLUCOSE): Primary | ICD-10-CM

## 2020-03-06 DIAGNOSIS — Z00.00 ROUTINE MEDICAL EXAM: ICD-10-CM

## 2020-03-06 DIAGNOSIS — R79.89 ELEVATED LFTS: ICD-10-CM

## 2020-03-07 LAB
ALBUMIN SERPL-MCNC: 4.7 G/DL (ref 3.5–5.2)
ALBUMIN/GLOB SERPL: 1.9 G/DL
ALP SERPL-CCNC: 70 U/L (ref 39–117)
ALT SERPL-CCNC: 55 U/L (ref 1–41)
APPEARANCE UR: CLEAR
AST SERPL-CCNC: 62 U/L (ref 1–40)
BASOPHILS # BLD AUTO: 0.02 10*3/MM3 (ref 0–0.2)
BASOPHILS NFR BLD AUTO: 0.5 % (ref 0–1.5)
BILIRUB SERPL-MCNC: 0.7 MG/DL (ref 0.2–1.2)
BILIRUB UR QL STRIP: NEGATIVE
BUN SERPL-MCNC: 31 MG/DL (ref 6–20)
BUN/CREAT SERPL: 22.8 (ref 7–25)
CALCIUM SERPL-MCNC: 9.7 MG/DL (ref 8.6–10.5)
CHLORIDE SERPL-SCNC: 103 MMOL/L (ref 98–107)
CHOLEST SERPL-MCNC: 139 MG/DL (ref 0–200)
CO2 SERPL-SCNC: 25.1 MMOL/L (ref 22–29)
COLOR UR: YELLOW
CREAT SERPL-MCNC: 1.36 MG/DL (ref 0.76–1.27)
EOSINOPHIL # BLD AUTO: 0.04 10*3/MM3 (ref 0–0.4)
EOSINOPHIL NFR BLD AUTO: 0.9 % (ref 0.3–6.2)
ERYTHROCYTE [DISTWIDTH] IN BLOOD BY AUTOMATED COUNT: 12.8 % (ref 12.3–15.4)
GLOBULIN SER CALC-MCNC: 2.5 GM/DL
GLUCOSE SERPL-MCNC: 112 MG/DL (ref 65–99)
GLUCOSE UR QL: NEGATIVE
HBA1C MFR BLD: 5.3 % (ref 4.8–5.6)
HCT VFR BLD AUTO: 45.9 % (ref 37.5–51)
HDLC SERPL-MCNC: 53 MG/DL (ref 40–60)
HGB BLD-MCNC: 16.5 G/DL (ref 13–17.7)
HGB UR QL STRIP: NEGATIVE
IMM GRANULOCYTES # BLD AUTO: 0.01 10*3/MM3 (ref 0–0.05)
IMM GRANULOCYTES NFR BLD AUTO: 0.2 % (ref 0–0.5)
KETONES UR QL STRIP: NEGATIVE
LDLC SERPL CALC-MCNC: 72 MG/DL (ref 0–100)
LEUKOCYTE ESTERASE UR QL STRIP: NEGATIVE
LYMPHOCYTES # BLD AUTO: 1.53 10*3/MM3 (ref 0.7–3.1)
LYMPHOCYTES NFR BLD AUTO: 36.3 % (ref 19.6–45.3)
MCH RBC QN AUTO: 33.9 PG (ref 26.6–33)
MCHC RBC AUTO-ENTMCNC: 35.9 G/DL (ref 31.5–35.7)
MCV RBC AUTO: 94.3 FL (ref 79–97)
MONOCYTES # BLD AUTO: 0.45 10*3/MM3 (ref 0.1–0.9)
MONOCYTES NFR BLD AUTO: 10.7 % (ref 5–12)
NEUTROPHILS # BLD AUTO: 2.17 10*3/MM3 (ref 1.7–7)
NEUTROPHILS NFR BLD AUTO: 51.4 % (ref 42.7–76)
NITRITE UR QL STRIP: NEGATIVE
NRBC BLD AUTO-RTO: 0 /100 WBC (ref 0–0.2)
PH UR STRIP: 5.5 [PH] (ref 5–8)
PLATELET # BLD AUTO: 182 10*3/MM3 (ref 140–450)
POTASSIUM SERPL-SCNC: 4.5 MMOL/L (ref 3.5–5.2)
PROT SERPL-MCNC: 7.2 G/DL (ref 6–8.5)
PROT UR QL STRIP: ABNORMAL
PSA SERPL-MCNC: 1.1 NG/ML (ref 0–4)
RBC # BLD AUTO: 4.87 10*6/MM3 (ref 4.14–5.8)
SODIUM SERPL-SCNC: 142 MMOL/L (ref 136–145)
SP GR UR: ABNORMAL (ref 1–1.03)
TRIGL SERPL-MCNC: 71 MG/DL (ref 0–150)
TSH SERPL DL<=0.005 MIU/L-ACNC: 2.84 UIU/ML (ref 0.27–4.2)
URATE SERPL-MCNC: 7.9 MG/DL (ref 3.4–7)
UROBILINOGEN UR STRIP-MCNC: ABNORMAL MG/DL
VLDLC SERPL CALC-MCNC: 14.2 MG/DL
WBC # BLD AUTO: 4.22 10*3/MM3 (ref 3.4–10.8)

## 2020-03-10 ENCOUNTER — OFFICE VISIT (OUTPATIENT)
Dept: INTERNAL MEDICINE | Facility: CLINIC | Age: 55
End: 2020-03-10

## 2020-03-10 VITALS
HEIGHT: 69 IN | HEART RATE: 74 BPM | BODY MASS INDEX: 35.25 KG/M2 | SYSTOLIC BLOOD PRESSURE: 142 MMHG | DIASTOLIC BLOOD PRESSURE: 82 MMHG | WEIGHT: 238 LBS

## 2020-03-10 DIAGNOSIS — R73.01 IFG (IMPAIRED FASTING GLUCOSE): ICD-10-CM

## 2020-03-10 DIAGNOSIS — R79.89 ABNORMAL LFTS: Primary | ICD-10-CM

## 2020-03-10 PROCEDURE — 99396 PREV VISIT EST AGE 40-64: CPT | Performed by: INTERNAL MEDICINE

## 2020-03-10 PROCEDURE — 99214 OFFICE O/P EST MOD 30 MIN: CPT | Performed by: INTERNAL MEDICINE

## 2020-03-10 NOTE — PROGRESS NOTES
Subjective   Jefry Kenney is a 54 y.o. male.   Chief Complaint   Patient presents with   • Annual Exam     No new complaints       This is a annual physical examination.  Also evaluation of health screening measures.  He is having no new problems.  He is rarely drinking alcohol at this point due to the previous abnormality of his liver function studies.  He did have full blood work prior to coming in for his visit today and I have reviewed that with him.       The following portions of the patient's history were reviewed and updated as appropriate: allergies, current medications, past family history, past medical history, past social history, past surgical history and problem list.    Review of Systems   Constitutional: Negative for activity change, appetite change, fatigue, fever, unexpected weight gain and unexpected weight loss.   HENT: Negative for swollen glands, trouble swallowing and voice change.    Eyes: Negative for blurred vision and visual disturbance.   Respiratory: Negative for cough and shortness of breath.    Cardiovascular: Negative for chest pain, palpitations and leg swelling.   Gastrointestinal: Negative for abdominal pain, constipation, diarrhea, nausea, vomiting and indigestion.   Endocrine: Negative for cold intolerance, heat intolerance, polydipsia and polyphagia.   Genitourinary: Negative for dysuria and frequency.   Musculoskeletal: Negative for arthralgias, back pain, joint swelling and neck pain.   Skin: Negative for color change, rash and skin lesions.   Neurological: Negative for dizziness, weakness, headache, memory problem and confusion.   Hematological: Does not bruise/bleed easily.   Psychiatric/Behavioral: Negative for agitation, hallucinations and suicidal ideas. The patient is not nervous/anxious.        Objective   Past Medical History:   Diagnosis Date   • Hx of colonic polyp       Past Surgical History:   Procedure Laterality Date   • COLONOSCOPY  01/15/2015    Hyperplastic  polyp at 45 cm repeat exam in 5 years   • HEMORRHOIDECTOMY     • HEMORROIDECTOMY  03/2019   • RECTAL EXAMINATION UNDER ANESTHESIA N/A 3/1/2019    Procedure: EXAM UNDER ANESTHESIA, HEMORROIDECTOMY;  Surgeon: Tori Steel MD;  Location: [facility];  Service: General      No current outpatient medications on file.     Vitals:    03/10/20 1254   BP: 142/82   Pulse: 74         03/10/20  1254   Weight: 108 kg (238 lb)     Patient's Body mass index is 35.15 kg/m². BMI is above normal parameters. Recommendations include: exercise counseling and nutrition counseling.      Physical Exam   Constitutional: He is oriented to person, place, and time. He appears well-developed and well-nourished.   HENT:   Head: Normocephalic and atraumatic.   Right Ear: External ear normal.   Left Ear: External ear normal.   Nose: Nose normal.   Mouth/Throat: Oropharynx is clear and moist.   Eyes: Pupils are equal, round, and reactive to light. Conjunctivae and EOM are normal.   Neck: Normal range of motion. Neck supple. No thyromegaly present.   Cardiovascular: Normal rate, regular rhythm, normal heart sounds and intact distal pulses.   Pulmonary/Chest: Effort normal and breath sounds normal.   Abdominal: Soft. Bowel sounds are normal.   Lymphadenopathy:     He has no cervical adenopathy.   Neurological: He is alert and oriented to person, place, and time.   Skin: Skin is warm and dry.   Psychiatric: He has a normal mood and affect. His behavior is normal. Thought content normal.   Nursing note and vitals reviewed.            Assessment/Plan   Diagnoses and all orders for this visit:    1. Abnormal LFTs (Primary)    2. IFG (impaired fasting glucose)      Patient is here today for his annual evaluation and healthcare screening he seems to be up-to-date on all of his immunizations with the exception of the new Shingrix I have given him information about that.  In regard to his impaired fasting glucose he remains in the prediabetic range and is  not in the full diabetic range at this point.  Patient has had abnormal liver function studies in the past and we are monitoring those he continues to have an elevated AST and ALT.  He has for the most part stopped using alcohol.  We are going to continue close follow-up and will see him back in 3 months

## 2020-03-18 ENCOUNTER — TELEPHONE (OUTPATIENT)
Dept: GASTROENTEROLOGY | Facility: CLINIC | Age: 55
End: 2020-03-18

## 2020-03-18 NOTE — TELEPHONE ENCOUNTER
Due to restrictions placed regarding Covid-19 epidemic we have postponed this patients procedure with Dr. Jaimes. We will keep their paperwork and call to reschedule once we are able to accommodate the patient. Patient is aware and agreeable with this.

## 2020-06-04 ENCOUNTER — TELEPHONE (OUTPATIENT)
Dept: GASTROENTEROLOGY | Facility: CLINIC | Age: 55
End: 2020-06-04

## 2020-06-04 NOTE — TELEPHONE ENCOUNTER
Patient was called to reschedule their recommended procedure which was cancelled due to Covid 19 crisis and governor mandates.  He has declined to reschedule at this time even after the importance of the procedure was explained. The patient was instructed to contact our office with any questions or concerns. They were offered an opportunity to schedule an appointment with the provider to discuss any concerns,  their medical condition and treatment recommendations and alternative options. They were also instructed to call the office to reschedule when they were willing to proceed.

## 2020-06-10 ENCOUNTER — RESULTS ENCOUNTER (OUTPATIENT)
Dept: INTERNAL MEDICINE | Facility: CLINIC | Age: 55
End: 2020-06-10

## 2020-06-10 DIAGNOSIS — R79.89 ABNORMAL LFTS: ICD-10-CM

## 2020-06-10 DIAGNOSIS — R73.01 IFG (IMPAIRED FASTING GLUCOSE): ICD-10-CM

## 2020-09-11 ENCOUNTER — PREP FOR SURGERY (OUTPATIENT)
Dept: GASTROENTEROLOGY | Facility: CLINIC | Age: 55
End: 2020-09-11

## 2020-09-11 DIAGNOSIS — K63.5 HYPERPLASTIC COLONIC POLYP, UNSPECIFIED PART OF COLON: Primary | ICD-10-CM

## 2020-10-09 ENCOUNTER — TRANSCRIBE ORDERS (OUTPATIENT)
Dept: ADMINISTRATIVE | Facility: HOSPITAL | Age: 55
End: 2020-10-09

## 2020-10-09 DIAGNOSIS — Z01.818 PRE-OP TESTING: Primary | ICD-10-CM

## 2020-10-13 ENCOUNTER — LAB (OUTPATIENT)
Dept: LAB | Facility: HOSPITAL | Age: 55
End: 2020-10-13

## 2020-10-13 PROCEDURE — C9803 HOPD COVID-19 SPEC COLLECT: HCPCS | Performed by: INTERNAL MEDICINE

## 2020-10-13 PROCEDURE — U0003 INFECTIOUS AGENT DETECTION BY NUCLEIC ACID (DNA OR RNA); SEVERE ACUTE RESPIRATORY SYNDROME CORONAVIRUS 2 (SARS-COV-2) (CORONAVIRUS DISEASE [COVID-19]), AMPLIFIED PROBE TECHNIQUE, MAKING USE OF HIGH THROUGHPUT TECHNOLOGIES AS DESCRIBED BY CMS-2020-01-R: HCPCS | Performed by: INTERNAL MEDICINE

## 2020-10-14 LAB
COVID LABCORP PRIORITY: NORMAL
SARS-COV-2 RNA RESP QL NAA+PROBE: NOT DETECTED

## 2020-10-16 ENCOUNTER — ANESTHESIA EVENT (OUTPATIENT)
Dept: GASTROENTEROLOGY | Facility: HOSPITAL | Age: 55
End: 2020-10-16

## 2020-10-16 ENCOUNTER — ANESTHESIA (OUTPATIENT)
Dept: GASTROENTEROLOGY | Facility: HOSPITAL | Age: 55
End: 2020-10-16

## 2020-10-16 ENCOUNTER — HOSPITAL ENCOUNTER (OUTPATIENT)
Facility: HOSPITAL | Age: 55
Setting detail: HOSPITAL OUTPATIENT SURGERY
Discharge: HOME | End: 2020-10-16
Attending: INTERNAL MEDICINE | Admitting: INTERNAL MEDICINE

## 2020-10-16 VITALS
TEMPERATURE: 97.3 F | WEIGHT: 232 LBS | HEART RATE: 73 BPM | SYSTOLIC BLOOD PRESSURE: 133 MMHG | OXYGEN SATURATION: 97 % | HEIGHT: 69 IN | RESPIRATION RATE: 15 BRPM | BODY MASS INDEX: 34.36 KG/M2 | DIASTOLIC BLOOD PRESSURE: 91 MMHG

## 2020-10-16 DIAGNOSIS — K63.5 HYPERPLASTIC COLONIC POLYP, UNSPECIFIED PART OF COLON: ICD-10-CM

## 2020-10-16 PROCEDURE — 88305 TISSUE EXAM BY PATHOLOGIST: CPT | Performed by: INTERNAL MEDICINE

## 2020-10-16 PROCEDURE — 45385 COLONOSCOPY W/LESION REMOVAL: CPT | Performed by: INTERNAL MEDICINE

## 2020-10-16 PROCEDURE — 25010000002 PROPOFOL 10 MG/ML EMULSION: Performed by: NURSE ANESTHETIST, CERTIFIED REGISTERED

## 2020-10-16 RX ORDER — LIDOCAINE HYDROCHLORIDE 10 MG/ML
0.5 INJECTION, SOLUTION EPIDURAL; INFILTRATION; INTRACAUDAL; PERINEURAL ONCE AS NEEDED
Status: DISCONTINUED | OUTPATIENT
Start: 2020-10-16 | End: 2020-10-16 | Stop reason: HOSPADM

## 2020-10-16 RX ORDER — SODIUM CHLORIDE 0.9 % (FLUSH) 0.9 %
10 SYRINGE (ML) INJECTION AS NEEDED
Status: DISCONTINUED | OUTPATIENT
Start: 2020-10-16 | End: 2020-10-16 | Stop reason: HOSPADM

## 2020-10-16 RX ORDER — SODIUM CHLORIDE 9 MG/ML
500 INJECTION, SOLUTION INTRAVENOUS CONTINUOUS PRN
Status: DISCONTINUED | OUTPATIENT
Start: 2020-10-16 | End: 2020-10-16 | Stop reason: HOSPADM

## 2020-10-16 RX ORDER — PROPOFOL 10 MG/ML
VIAL (ML) INTRAVENOUS AS NEEDED
Status: DISCONTINUED | OUTPATIENT
Start: 2020-10-16 | End: 2020-10-16 | Stop reason: SURG

## 2020-10-16 RX ADMIN — PROPOFOL 50 MG: 10 INJECTION, EMULSION INTRAVENOUS at 09:31

## 2020-10-16 RX ADMIN — LIDOCAINE HYDROCHLORIDE 100 MG: 20 INJECTION, SOLUTION INTRAVENOUS at 09:26

## 2020-10-16 RX ADMIN — PROPOFOL 50 MG: 10 INJECTION, EMULSION INTRAVENOUS at 09:35

## 2020-10-16 RX ADMIN — SODIUM CHLORIDE 500 ML: 9 INJECTION, SOLUTION INTRAVENOUS at 07:34

## 2020-10-16 RX ADMIN — PROPOFOL 50 MG: 10 INJECTION, EMULSION INTRAVENOUS at 09:28

## 2020-10-16 RX ADMIN — PROPOFOL 100 MG: 10 INJECTION, EMULSION INTRAVENOUS at 09:26

## 2020-10-16 RX ADMIN — PROPOFOL 50 MG: 10 INJECTION, EMULSION INTRAVENOUS at 09:37

## 2020-10-16 NOTE — H&P
"[facility]  Pre Procedure History & Physical    Chief Complaint:   History of polyps    Subjective     HPI:   History of polyps    Past Medical History:   Past Medical History:   Diagnosis Date   • Hx of colonic polyp        Past Surgical History:  Past Surgical History:   Procedure Laterality Date   • COLONOSCOPY  01/15/2015    Hyperplastic polyp at 45 cm repeat exam in 5 years   • HEMORRHOIDECTOMY     • HEMORROIDECTOMY  03/2019   • RECTAL EXAMINATION UNDER ANESTHESIA N/A 3/1/2019    Procedure: EXAM UNDER ANESTHESIA, HEMORROIDECTOMY;  Surgeon: Tori Steel MD;  Location: [facility];  Service: General       Family History:  Family History   Problem Relation Age of Onset   • Colon cancer Father    • No Known Problems Mother        Social History:   reports that he has never smoked. He has never used smokeless tobacco. He reports current alcohol use. He reports that he does not use drugs.    Medications:   Prior to Admission medications    Not on File       Allergies:  Patient has no known allergies.    Objective     Blood pressure 145/97, pulse 85, temperature 97.3 °F (36.3 °C), temperature source Temporal, resp. rate 20, height 175.3 cm (69\"), weight 105 kg (232 lb), SpO2 96 %.    Physical Exam   Constitutional: Pt is oriented to person, place, and in no distress.   HENT: Mouth/Throat: Oropharynx is clear.   Cardiovascular: Normal rate, regular rhythm.    Pulmonary/Chest: Effort normal. No respiratory distress. No  wheezes.   Abdominal: Soft. Non-distended.  Skin: Skin is warm and dry.   Psychiatric: Mood, memory, affect and judgment appear normal.     Assessment/Plan     Diagnosis:  Here for colonoscopy.  History of polyps    Anticipated Surgical Procedure:    History of polyps proceed with colonoscopy    The following major R/B/A were discussed with the patient, however the list is not all inclusive . Risk:  Bleeding (immediate and delayed), perforation (rupture or tear), reaction to " medication, missed lesion/cancer, pain during the procedure, infection, need for surgery, need for ostomy, need for mechanical ventilation (breathing machine), death.  Benefits: removal of polyp/tissue, burn/clip/or inject to stop bleeding, removal of foreign body, dilate any stricture.  Alternatives: Xray or CT, surgery, do nothing with associated risk   The patient was given time to ask question and received explanation, and agrees to proceed as per History and Physical.   No guarantee given or expressed.    EMR Dragon/transcription disclaimer: Much of this encounter note is an electronic transcription/translation of spoken language to printed text.  The electronic translation of spoken language may permit erroneous, or at times, nonsensical words or phrases to be inadvertently transcribed.  Although I have reviewed the note for such errors, some may still exist.    Barry Jaimes MD  09:17 CDT  10/16/2020

## 2020-10-16 NOTE — ANESTHESIA PREPROCEDURE EVALUATION
Anesthesia Evaluation     no history of anesthetic complications:  NPO Solid Status: > 8 hours  NPO Liquid Status: > 2 hours           Airway   Mallampati: II  Neck ROM: limited  No difficulty expected  Dental      Pulmonary    (-) sleep apnea, not a smoker  Cardiovascular   Exercise tolerance: good (4-7 METS)    (-) hypertension, CAD      Neuro/Psych  (-) seizures, TIA, CVA  GI/Hepatic/Renal/Endo    (+) obesity,     (-) liver disease, no renal disease, diabetes    Musculoskeletal     Abdominal    Substance History      OB/GYN          Other                        Anesthesia Plan    ASA 2     MAC     intravenous induction     Anesthetic plan, all risks, benefits, and alternatives have been provided, discussed and informed consent has been obtained with: patient.

## 2020-10-19 LAB
LAB AP CASE REPORT: NORMAL
PATH REPORT.FINAL DX SPEC: NORMAL
PATH REPORT.GROSS SPEC: NORMAL

## 2021-06-03 ENCOUNTER — OFFICE VISIT (OUTPATIENT)
Dept: INTERNAL MEDICINE | Facility: CLINIC | Age: 56
End: 2021-06-03

## 2021-06-03 VITALS
TEMPERATURE: 97.3 F | SYSTOLIC BLOOD PRESSURE: 132 MMHG | DIASTOLIC BLOOD PRESSURE: 76 MMHG | HEIGHT: 69 IN | WEIGHT: 239 LBS | HEART RATE: 79 BPM | BODY MASS INDEX: 35.4 KG/M2 | OXYGEN SATURATION: 98 %

## 2021-06-03 DIAGNOSIS — S39.012A LOW BACK STRAIN, INITIAL ENCOUNTER: Primary | ICD-10-CM

## 2021-06-03 PROCEDURE — 99213 OFFICE O/P EST LOW 20 MIN: CPT | Performed by: NURSE PRACTITIONER

## 2021-06-03 PROCEDURE — 96372 THER/PROPH/DIAG INJ SC/IM: CPT | Performed by: NURSE PRACTITIONER

## 2021-06-03 RX ORDER — CYCLOBENZAPRINE HCL 10 MG
10 TABLET ORAL 3 TIMES DAILY PRN
Qty: 10 TABLET | Refills: 0 | Status: SHIPPED | OUTPATIENT
Start: 2021-06-03 | End: 2022-07-11 | Stop reason: SDUPTHER

## 2021-06-03 RX ORDER — TRIAMCINOLONE ACETONIDE 40 MG/ML
60 INJECTION, SUSPENSION INTRA-ARTICULAR; INTRAMUSCULAR ONCE
Status: COMPLETED | OUTPATIENT
Start: 2021-06-03 | End: 2021-06-03

## 2021-06-03 RX ADMIN — TRIAMCINOLONE ACETONIDE 60 MG: 40 INJECTION, SUSPENSION INTRA-ARTICULAR; INTRAMUSCULAR at 11:48

## 2021-06-03 NOTE — PROGRESS NOTES
Subjective   Jefry Kenney is a 56 y.o. male.   Chief Complaint   Patient presents with   • Back Pain     Center Lower Back Pain, happened today       Jefry presents today for low back pain that started suddenly when he bent over to dry himself after showering.  He reports the back is stiff and rates his pain at 3/10.  He denies any pain to the legs, numbness, or tingling.  He denies changes in bowel or bladder function.  He states he has done this in the past but it has been a couple of years.  With previous incidents he has had steroid injection that gives him quick relief.  At this point he has taken 4 tablets of Naproxen and tried to perform his stretches he usually uses but has not had any relief.  He presents today hoping to get an injection before the back gets stiffer.        The following portions of the patient's history were reviewed and updated as appropriate: allergies, current medications, past family history, past medical history, past social history, past surgical history and problem list.    Review of Systems   Constitutional: Negative for activity change, appetite change, fatigue, fever, unexpected weight gain and unexpected weight loss.   HENT: Negative for swollen glands, trouble swallowing and voice change.    Eyes: Negative for blurred vision and visual disturbance.   Respiratory: Negative for cough and shortness of breath.    Cardiovascular: Negative for chest pain, palpitations and leg swelling.   Gastrointestinal: Negative for abdominal pain, constipation, diarrhea, nausea, vomiting and indigestion.   Endocrine: Negative for cold intolerance, heat intolerance, polydipsia and polyphagia.   Genitourinary: Negative for dysuria and frequency.   Musculoskeletal: Positive for back pain. Negative for arthralgias, joint swelling and neck pain.   Skin: Negative for color change, rash and skin lesions.   Neurological: Negative for dizziness, weakness, headache, memory problem and confusion.    Hematological: Does not bruise/bleed easily.   Psychiatric/Behavioral: Negative for agitation, hallucinations and suicidal ideas. The patient is not nervous/anxious.        Objective   Past Medical History:   Diagnosis Date   • Hx of colonic polyp       Past Surgical History:   Procedure Laterality Date   • COLONOSCOPY  01/15/2015    Hyperplastic polyp at 45 cm repeat exam in 5 years   • COLONOSCOPY N/A 10/16/2020    Procedure: COLONOSCOPY WITH ANESTHESIA;  Surgeon: Barry Jaimes MD;  Location: [facility] ENDOSCOPY;  Service: Gastroenterology;  Laterality: N/A;  pre: hx polyps  post: polyp  concepcion queen    • HEMORRHOIDECTOMY     • HEMORROIDECTOMY  03/2019   • RECTAL EXAMINATION UNDER ANESTHESIA N/A 3/1/2019    Procedure: EXAM UNDER ANESTHESIA, HEMORROIDECTOMY;  Surgeon: Tori Steel MD;  Location: [facility] OR;  Service: General        Current Outpatient Medications:   •  cyclobenzaprine (FLEXERIL) 10 MG tablet, Take 1 tablet by mouth 3 (Three) Times a Day As Needed for Muscle Spasms., Disp: 10 tablet, Rfl: 0    Current Facility-Administered Medications:   •  triamcinolone acetonide (KENALOG-40) injection 60 mg, 60 mg, Intramuscular, Once, Fransisca Guerrero APRN     Vitals:    06/03/21 1114   BP: 132/76   Pulse: 79   Temp: 97.3 °F (36.3 °C)   SpO2: 98%         06/03/21  1114   Weight: 108 kg (239 lb)         Physical Exam  Constitutional:       General: He is not in acute distress.     Appearance: He is well-developed.   HENT:      Head: Normocephalic.      Right Ear: External ear normal.      Left Ear: External ear normal.      Mouth/Throat:      Mouth: No oral lesions.   Eyes:      Conjunctiva/sclera: Conjunctivae normal.   Cardiovascular:      Rate and Rhythm: Normal rate and regular rhythm.      Heart sounds: Normal heart sounds.   Pulmonary:      Effort: Pulmonary effort is normal.      Breath sounds: Normal breath sounds.   Musculoskeletal:      Cervical back: No signs of trauma. No pain with movement,  spinous process tenderness or muscular tenderness. Normal range of motion.      Lumbar back: Tenderness present. No swelling, edema or bony tenderness. Decreased range of motion. Negative right straight leg raise test and negative left straight leg raise test.        Back:       Comments: Patient leaning to the right side to avoid pain during the exam; moving slowly but able to get on exam table and lie flat.  Discomfort with palpation to the left lower back.    Skin:     General: Skin is warm and dry.   Neurological:      Mental Status: He is alert and oriented to person, place, and time.      Gait: Gait normal.   Psychiatric:         Mood and Affect: Mood normal.         Speech: Speech normal.         Behavior: Behavior normal.         Thought Content: Thought content normal.               Assessment/Plan   Diagnoses and all orders for this visit:    1. Low back strain, initial encounter (Primary)  -     triamcinolone acetonide (KENALOG-40) injection 60 mg  -     cyclobenzaprine (FLEXERIL) 10 MG tablet; Take 1 tablet by mouth 3 (Three) Times a Day As Needed for Muscle Spasms.  Dispense: 10 tablet; Refill: 0        Will give Kenalog injection today.  I've discussed applying heat, stretching, and can use Flexeril as needed.  If no improvement return for further evaluation.  I have instructed to go to ER for worsening pain or changes in bowel or bladder function.  I have discussed avoiding operating heavy machinery while taking Flexeril, due to drowsiness effect.  I did inform that 4 tablets of Naproxen is too many at one time.    He is overdue for his annual physical.  He has been informed of this and encouraged to schedule to have this completed.

## 2022-07-11 ENCOUNTER — OFFICE VISIT (OUTPATIENT)
Dept: INTERNAL MEDICINE | Facility: CLINIC | Age: 57
End: 2022-07-11

## 2022-07-11 ENCOUNTER — TELEPHONE (OUTPATIENT)
Dept: INTERNAL MEDICINE | Facility: CLINIC | Age: 57
End: 2022-07-11

## 2022-07-11 ENCOUNTER — HOSPITAL ENCOUNTER (OUTPATIENT)
Dept: GENERAL RADIOLOGY | Facility: HOSPITAL | Age: 57
Discharge: HOME | End: 2022-07-11
Admitting: NURSE PRACTITIONER

## 2022-07-11 VITALS
TEMPERATURE: 97.1 F | BODY MASS INDEX: 34.96 KG/M2 | DIASTOLIC BLOOD PRESSURE: 80 MMHG | HEIGHT: 69 IN | HEART RATE: 83 BPM | OXYGEN SATURATION: 99 % | WEIGHT: 236 LBS | SYSTOLIC BLOOD PRESSURE: 144 MMHG

## 2022-07-11 DIAGNOSIS — S39.012A LOW BACK STRAIN, INITIAL ENCOUNTER: ICD-10-CM

## 2022-07-11 DIAGNOSIS — S39.012A LOW BACK STRAIN, INITIAL ENCOUNTER: Primary | ICD-10-CM

## 2022-07-11 DIAGNOSIS — S32.020A COMPRESSION FRACTURE OF L2 VERTEBRA, INITIAL ENCOUNTER: ICD-10-CM

## 2022-07-11 PROCEDURE — 99213 OFFICE O/P EST LOW 20 MIN: CPT | Performed by: NURSE PRACTITIONER

## 2022-07-11 PROCEDURE — 96372 THER/PROPH/DIAG INJ SC/IM: CPT | Performed by: NURSE PRACTITIONER

## 2022-07-11 PROCEDURE — 72100 X-RAY EXAM L-S SPINE 2/3 VWS: CPT

## 2022-07-11 RX ORDER — CYCLOBENZAPRINE HCL 10 MG
10 TABLET ORAL 3 TIMES DAILY PRN
Qty: 10 TABLET | Refills: 0 | Status: SHIPPED | OUTPATIENT
Start: 2022-07-11

## 2022-07-11 RX ORDER — METHYLPREDNISOLONE 4 MG/1
TABLET ORAL
Qty: 21 TABLET | Refills: 0 | Status: SHIPPED | OUTPATIENT
Start: 2022-07-11

## 2022-07-11 RX ORDER — TRIAMCINOLONE ACETONIDE 40 MG/ML
60 INJECTION, SUSPENSION INTRA-ARTICULAR; INTRAMUSCULAR ONCE
Status: COMPLETED | OUTPATIENT
Start: 2022-07-11 | End: 2022-07-11

## 2022-07-11 RX ADMIN — TRIAMCINOLONE ACETONIDE 60 MG: 40 INJECTION, SUSPENSION INTRA-ARTICULAR; INTRAMUSCULAR at 11:07

## 2022-07-11 NOTE — PROGRESS NOTES
Subjective     Chief Complaint:  Low back pain    HPI:  Patient presents to the office today with low back pain that started yesterday.  He was doing some outdoor work which did involve some heavy lifting although patient states he tries not to lift anything too heavy.  He noticed after working that his back started tightening up.  He described this initially as a cramping on the right side which then progressed to a sharp pain which went all the way across his lower back.  He has no lower extremity numbness or tingling but states he is having some difficulty walking.  It hurts for him to try to straighten his spine completely and also hurts to bend at the waist or twist.  He compares the sensation to a pair of pliers pinching his muscles.  He denies any loss of bowel or bladder control.  He did take some naproxen and tried some stretching without much success.  He admits to taking 4 naproxen at a time.    Patient's PMR from outside medical facility reviewed and noted.    Past Medical History:   Past Medical History:   Diagnosis Date   • Hx of colonic polyp      Past Surgical History:  Past Surgical History:   Procedure Laterality Date   • COLONOSCOPY  01/15/2015    Hyperplastic polyp at 45 cm repeat exam in 5 years   • COLONOSCOPY N/A 10/16/2020    Procedure: COLONOSCOPY WITH ANESTHESIA;  Surgeon: Barry Jaimes MD;  Location: [facility] ENDOSCOPY;  Service: Gastroenterology;  Laterality: N/A;  pre: hx polyps  post: polyp  concepcion queen    • HEMORRHOIDECTOMY     • HEMORROIDECTOMY  03/2019   • RECTAL EXAMINATION UNDER ANESTHESIA N/A 3/1/2019    Procedure: EXAM UNDER ANESTHESIA, HEMORROIDECTOMY;  Surgeon: Tori Steel MD;  Location: [facility] OR;  Service: General     Social History:  reports that he has never smoked. He has never used smokeless tobacco. He reports current alcohol use. He reports that he does not use drugs.    Family History: family history includes Colon cancer in his father; No Known  "Problems in his mother.      Allergies:  No Known Allergies  Medications:  Prior to Admission medications    Medication Sig Start Date End Date Taking? Authorizing Provider   cyclobenzaprine (FLEXERIL) 10 MG tablet Take 1 tablet by mouth 3 (Three) Times a Day As Needed for Muscle Spasms. 7/11/22  Yes Marion Marinelli APRN   methylPREDNISolone (MEDROL) 4 MG dose pack Take as directed on package instructions. 7/11/22   Marion Marinelli APRN   cyclobenzaprine (FLEXERIL) 10 MG tablet Take 1 tablet by mouth 3 (Three) Times a Day As Needed for Muscle Spasms. 6/3/21 7/11/22  Fransisca Guerrero APRN       Objective     Vital Signs: /80 (BP Location: Left arm, Patient Position: Sitting, Cuff Size: Adult)   Pulse 83   Temp 97.1 °F (36.2 °C) (Temporal)   Ht 175.3 cm (69\")   Wt 107 kg (236 lb)   SpO2 99%   BMI 34.85 kg/m²   Physical Exam  Vitals and nursing note reviewed.   Constitutional:       General: He is not in acute distress.     Appearance: He is not ill-appearing or toxic-appearing.   HENT:      Head: Normocephalic and atraumatic.      Mouth/Throat:      Mouth: Mucous membranes are moist.      Pharynx: Oropharynx is clear.   Cardiovascular:      Rate and Rhythm: Normal rate and regular rhythm.      Pulses: Normal pulses.      Heart sounds: Normal heart sounds.   Pulmonary:      Effort: Pulmonary effort is normal.      Breath sounds: No wheezing, rhonchi or rales.   Abdominal:      General: Bowel sounds are normal. There is no distension.      Palpations: Abdomen is soft.      Tenderness: There is no abdominal tenderness.   Musculoskeletal:         General: No swelling or tenderness. Normal range of motion.      Cervical back: Normal range of motion and neck supple. No tenderness.      Comments: Patient does not have tenderness to palpation to the spine or paraspinal muscles.   Skin:     General: Skin is warm and dry.      Findings: No erythema or rash.   Neurological:      General: No focal deficit " present.      Mental Status: He is alert and oriented to person, place, and time.   Psychiatric:         Mood and Affect: Mood normal.         Behavior: Behavior normal.         Thought Content: Thought content normal.         Judgment: Judgment normal.       Results Reviewed:  I reviewed an office visit note from 6/3/2021 in which the patient also presented with low back pain and saw GEORGE Pepe.    Assessment / Plan     Assessment/Plan:  Diagnoses and all orders for this visit:    1. Low back strain, initial encounter  -     XR Spine Lumbar 2 or 3 View; Future  -     triamcinolone acetonide (KENALOG-40) injection 60 mg  -     cyclobenzaprine (FLEXERIL) 10 MG tablet; Take 1 tablet by mouth 3 (Three) Times a Day As Needed for Muscle Spasms.  Dispense: 10 tablet; Refill: 0    Other orders  -     methylPREDNISolone (MEDROL) 4 MG dose pack; Take as directed on package instructions.  Dispense: 21 tablet; Refill: 0       Patient presents today with bilateral lower back pain which started after working in the yard yesterday.  This is described as a sharp pain across the lower back.  He denies any numbness or tingling to the lower extremities and no loss of bowel or bladder control.  We will send for an x-ray of the lumbar spine today.  He will receive a Kenalog injection in the office followed by a Medrol Dosepak.  We will also prescribe Flexeril as needed.  Advised alternating heat and ice.  I also advised that he could continue NSAIDs however take as directed on packaging as he has taken higher dose of Naproxen than is recommended.  Patient is to call if symptoms do not improve in the next 48 hours.    He has been overdue for an annual physical since March 2021 and I did advise him of this.  He is agreeable to return in October for this.    Return in about 3 months (around 10/11/2022) for Annual physical. unless patient needs to be seen sooner or acute issues arise.    I have discussed the patient results/orders  and and plan/recommendation with them at today's visit.      Marion Marinelli, APRN   07/11/2022

## 2022-07-11 NOTE — TELEPHONE ENCOUNTER
Caller: Jefry Kenney    Relationship to patient: Self    Best call back number: [phone]    Patient is needing a steroid injection for his back spasm and pain.

## 2022-07-22 ENCOUNTER — APPOINTMENT (OUTPATIENT)
Dept: MRI IMAGING | Facility: HOSPITAL | Age: 57
End: 2022-07-22

## 2022-07-27 ENCOUNTER — APPOINTMENT (OUTPATIENT)
Dept: MRI IMAGING | Facility: HOSPITAL | Age: 57
End: 2022-07-27

## (undated) DEVICE — PAD GRND REM POLYHESIVE A/ DISP

## (undated) DEVICE — PK TURNOVER RM ADV

## (undated) DEVICE — MASK,OXYGEN,MED CONC,ADLT,7' TUB, UC: Brand: PENDING

## (undated) DEVICE — ELECTRD BLD EDGE/INSUL1P 2.4X5.1MM STRL

## (undated) DEVICE — SENSR O2 OXIMAX FNGR A/ 18IN NONSTR

## (undated) DEVICE — YANKAUER,BULB TIP WITH VENT: Brand: ARGYLE

## (undated) DEVICE — Device: Brand: DEFENDO AIR/WATER/SUCTION AND BIOPSY VALVE

## (undated) DEVICE — TRY PREP SCRB VAG PVP

## (undated) DEVICE — CUFF,BP,DISP,1 TUBE,ADULT,HP: Brand: MEDLINE

## (undated) DEVICE — TUBING, SUCTION, 1/4" X 12', STRAIGHT: Brand: MEDLINE

## (undated) DEVICE — THE SINGLE USE ETRAP – POLYP TRAP IS USED FOR SUCTION RETRIEVAL OF ENDOSCOPICALLY REMOVED POLYPS.: Brand: ETRAP

## (undated) DEVICE — GAUZE,SPONGE,FLUFF,6"X6.75",STRL,10/TRAY: Brand: MEDLINE

## (undated) DEVICE — TBG SMPL FLTR LINE NASL 02/C02 A/ BX/100

## (undated) DEVICE — HDRST INTUB GENTLETOUCH SLOT 7IN RT

## (undated) DEVICE — THE CHANNEL CLEANING BRUSH IS A NYLON FLEXI BRUSH ATTACHED TO A FLEXIBLE PLASTIC SHEATH DESIGNED TO SAFELY REMOVE DEBRIS FROM FLEXIBLE ENDOSCOPES.

## (undated) DEVICE — SPNG GZ WOVN 4X4IN 12PLY 10/BX STRL

## (undated) DEVICE — GLV SURG BIOGEL LTX PF 6 1/2

## (undated) DEVICE — SNAR POLYP SENSATION MICRO OVL 13 240X40